# Patient Record
Sex: FEMALE | Race: WHITE | URBAN - METROPOLITAN AREA
[De-identification: names, ages, dates, MRNs, and addresses within clinical notes are randomized per-mention and may not be internally consistent; named-entity substitution may affect disease eponyms.]

---

## 2022-06-27 ENCOUNTER — OUT OF OFFICE VISIT (OUTPATIENT)
Dept: URBAN - METROPOLITAN AREA MEDICAL CENTER 23 | Facility: MEDICAL CENTER | Age: 67
End: 2022-06-27
Payer: MEDICARE

## 2022-06-27 DIAGNOSIS — I95.9 HYPOTENSION: ICD-10-CM

## 2022-06-27 DIAGNOSIS — R19.7 ACUTE DIARRHEA: ICD-10-CM

## 2022-06-27 DIAGNOSIS — N17.9 ACUTE INJURY OF KIDNEY: ICD-10-CM

## 2022-06-27 DIAGNOSIS — R93.3 ABN FINDINGS-GI TRACT: ICD-10-CM

## 2022-06-27 PROCEDURE — 99223 1ST HOSP IP/OBS HIGH 75: CPT | Performed by: INTERNAL MEDICINE

## 2022-06-27 PROCEDURE — G8427 DOCREV CUR MEDS BY ELIG CLIN: HCPCS | Performed by: INTERNAL MEDICINE

## 2022-06-27 PROCEDURE — 99232 SBSQ HOSP IP/OBS MODERATE 35: CPT | Performed by: INTERNAL MEDICINE

## 2022-09-06 ENCOUNTER — DASHBOARD ENCOUNTERS (OUTPATIENT)
Age: 67
End: 2022-09-06

## 2022-09-06 ENCOUNTER — OFFICE VISIT (OUTPATIENT)
Dept: URBAN - METROPOLITAN AREA CLINIC 54 | Facility: CLINIC | Age: 67
End: 2022-09-06
Payer: MEDICARE

## 2022-09-06 ENCOUNTER — WEB ENCOUNTER (OUTPATIENT)
Dept: URBAN - METROPOLITAN AREA CLINIC 54 | Facility: CLINIC | Age: 67
End: 2022-09-06

## 2022-09-06 VITALS
HEIGHT: 63 IN | WEIGHT: 170.2 LBS | HEART RATE: 77 BPM | SYSTOLIC BLOOD PRESSURE: 159 MMHG | TEMPERATURE: 98.1 F | DIASTOLIC BLOOD PRESSURE: 104 MMHG | BODY MASS INDEX: 30.16 KG/M2

## 2022-09-06 DIAGNOSIS — I10 HYPERTENSION, UNSPECIFIED TYPE: ICD-10-CM

## 2022-09-06 DIAGNOSIS — R19.7 DIARRHEA, UNSPECIFIED TYPE: ICD-10-CM

## 2022-09-06 DIAGNOSIS — K57.92 ACUTE DIVERTICULITIS: ICD-10-CM

## 2022-09-06 DIAGNOSIS — Z90.49 STATUS POST CHOLECYSTECTOMY: ICD-10-CM

## 2022-09-06 DIAGNOSIS — N18.9 CHRONIC KIDNEY DISEASE, UNSPECIFIED CKD STAGE: ICD-10-CM

## 2022-09-06 DIAGNOSIS — N17.9 AKI (ACUTE KIDNEY INJURY): ICD-10-CM

## 2022-09-06 PROBLEM — 735593008: Status: ACTIVE | Noted: 2022-09-06

## 2022-09-06 PROBLEM — 38341003: Status: ACTIVE | Noted: 2022-09-06

## 2022-09-06 PROBLEM — 709044004: Status: ACTIVE | Noted: 2022-09-06

## 2022-09-06 PROBLEM — 428882003: Status: ACTIVE | Noted: 2022-09-06

## 2022-09-06 PROCEDURE — 99203 OFFICE O/P NEW LOW 30 MIN: CPT

## 2022-09-06 NOTE — PHYSICAL EXAM GASTROINTESTINAL
Abdomen , soft, nontender, nondistended , well-healing laparoscopic surgical incisions, no guarding or rigidity , no masses palpable , normal bowel sounds , Liver and Spleen , no hepatomegaly present , no hepatosplenomegaly , liver nontender , spleen not palpable

## 2022-09-06 NOTE — HPI-TODAY'S VISIT:
9/6/22: Pt is 66 yo female with PMH of Asperger's disorder, diabetes, depression, GERD, hypertension that presents for [facility] follow up from 6/27 - 6/30 for diarrhea with severe dehydration and associated CORINA on CKD, hypotension, and electrolyte abnormality. CT imaging suggestive of ?diverticulitis without evidence of perforation or abscess. Positive for fecal leuks, otherwise stool studies negative including a negative cdiff. She was given IV fluids and treated with rocephin/flagyl and florastor. Her diarrhea resolved. Her Cr was up to 3.8 on admission but it returned to her baseline of 1.5-2.0 eventually. Initially, plan was to perform a colo on 7/1 for suspected MC,  but given her significant improvement, this was cancelled and recommended outpatient f/u.  Today pt is feeling well without complaints. Diarrhea has resolved. Pt is s/p CCY on 9/1 with Dr. Jt Franz. She has a colonoscopy scheduled with him on 9/11. Pt has never had a colonoscopy before. Most recent labs from 8/31 are significant for stable CKD with eGFR 29.1, Cr 1.77, BUN 26, Hgb A1c 8.2. Otherwise unremarkable.   6/27/22:  - Diverticulosis sigmoid colon with changes of diverticulitis without abscess or perforation. - Cholelithiasis